# Patient Record
(demographics unavailable — no encounter records)

---

## 2025-05-14 NOTE — HISTORY OF PRESENT ILLNESS
[FreeTextEntry1] : ADEBAYO PACHECO is a 51 year old female here for a physical exam. [de-identified] : Her last physical exam was last year  Vaccines: Tetanus is Not up to date, Tdap 2010 Pneumococcal vaccination is Not up to date Shingrix is Not up to date  Her last dentist visit was within past 6-12 months Her last eye doctor appointment was less than one year ago Her last dermatologist visit was less than one year ago  GYN visit is up to date, 4/2024 Dr. Metzger Mammogram is up to date, 4/2024 stable/benign mammo/US [facility] Colon cancer screening is NOT up to date, colonoscopy 12/2019 wnl, rpt due at 5 yrs (12/2024), Dr. Richey. Has appt scheduled June 2025.   Her diet is healthy overall most of the time, currently doing a little more eating on the run as is lacrosse season and is travelling to watch son play Exercise: regular strengthening and cardio   going to see daughter Libby who is a travel nurse based in [place] soon

## 2025-05-14 NOTE — HEALTH RISK ASSESSMENT
[0] : 2) Feeling down, depressed, or hopeless: Not at all (0) [PHQ-2 Negative - No further assessment needed] : PHQ-2 Negative - No further assessment needed [Never] : Never [DYP1Lklbl] : 0

## 2025-05-14 NOTE — REVIEW OF SYSTEMS
[Palpitations] : palpitations [Joint Pain] : joint pain [Joint Stiffness] : joint stiffness [Muscle Pain] : muscle pain [Negative] : Heme/Lymph [Chest Pain] : no chest pain [Lower Ext Edema] : no lower extremity edema [Abdominal Pain] : no abdominal pain [Diarrhea] : diarrhea [Vomiting] : no vomiting [Heartburn] : no heartburn [Melena] : no melena [FreeTextEntry5] : has h/o MVP seen on prior Echo, gets occas palps but are brief and no assoc sxs, is due for card f/u and might prefer to shift to female card. Dad dx with HOCM and needed debulking surg and she was advised to get checked. She had Echo Spring 2022 with nl LV size  [FreeTextEntry7] : has h/o HH seen on EGD but no signif heartburn [FreeTextEntry9] : some joint aches and pains and stiffness due to OA, also tweaked her back moving furniture earlier this week, going to chiro today and if needed will add massage and acupuncture and will let me know if wants to do PT (L lower back pain rad to L hip)

## 2025-05-14 NOTE — PLAN
[FreeTextEntry1] : Continue all medications as prescribed. Check labs as above. Will adjust any medications based upon lab results.  Reviewed age-appropriate preventive screening tests with patient. Due for gyn exam (names given as might prefer to shift to female provider), mammogram and colonoscopy screening and will schedule/has scheduled for near future.   Reviewed/recommended annual flu vaccine, Tdap booster, pneumococcal booster (PCV20 or PCV21) and Shingrix series.   Defers on ECG today and will sched with card for near future   Discussed clean eating (eg Mediterranean style plant focused whole food eating plan) and regular exercise/staying as physically active as possible. Include balance exercises and strength training and core strengthening exercises for bone health and to decrease risk for falls.  Reviewed importance of good self care (e.g. meditation, yoga, adequate rest, regular exercise, magnesium, clean eating, etc.).  Follow up with specialists as recommended by them. Asked her to have any/all specialists outside [facility] network send consult notes/test results etc to keep me informed.   Follow up for next physical in one year.

## 2025-05-14 NOTE — HEALTH RISK ASSESSMENT
[0] : 2) Feeling down, depressed, or hopeless: Not at all (0) [PHQ-2 Negative - No further assessment needed] : PHQ-2 Negative - No further assessment needed [Never] : Never [BII4Rerur] : 0

## 2025-05-14 NOTE — PHYSICAL EXAM
[No Acute Distress] : no acute distress [Well Nourished] : well nourished [Well Developed] : well developed [Well-Appearing] : well-appearing [Normal Sclera/Conjunctiva] : normal sclera/conjunctiva [EOMI] : extraocular movements intact [Normal Outer Ear/Nose] : the outer ears and nose were normal in appearance [Normal Oropharynx] : the oropharynx was normal [No JVD] : no jugular venous distention [No Lymphadenopathy] : no lymphadenopathy [Supple] : supple [Thyroid Normal, No Nodules] : the thyroid was normal and there were no nodules present [No Respiratory Distress] : no respiratory distress  [No Accessory Muscle Use] : no accessory muscle use [Clear to Auscultation] : lungs were clear to auscultation bilaterally [Normal Rate] : normal rate  [Regular Rhythm] : with a regular rhythm [Normal S1, S2] : normal S1 and S2 [No Murmur] : no murmur heard [No Carotid Bruits] : no carotid bruits [No Varicosities] : no varicosities [Pedal Pulses Present] : the pedal pulses are present [No Edema] : there was no peripheral edema [No Extremity Clubbing/Cyanosis] : no extremity clubbing/cyanosis [Soft] : abdomen soft [Non Tender] : non-tender [Non-distended] : non-distended [No Masses] : no abdominal mass palpated [No HSM] : no HSM [Normal Bowel Sounds] : normal bowel sounds [Normal Posterior Cervical Nodes] : no posterior cervical lymphadenopathy [Normal Anterior Cervical Nodes] : no anterior cervical lymphadenopathy [No Joint Swelling] : no joint swelling [Grossly Normal Strength/Tone] : grossly normal strength/tone [No Rash] : no rash [Coordination Grossly Intact] : coordination grossly intact [No Focal Deficits] : no focal deficits [Normal Gait] : normal gait [Normal Affect] : the affect was normal [Normal Insight/Judgement] : insight and judgment were intact [No CVA Tenderness] : no CVA  tenderness [No Spinal Tenderness] : no spinal tenderness [de-identified] : +TTP over L SI joint region/glutes,  No rash in area of back pain , in pain and takes care with position changes but able to get on/.off exam table independently, nontoxic  [de-identified] : no s/sxs active synovitis

## 2025-05-14 NOTE — REVIEW OF SYSTEMS
Salas can you please take care of this  I do not refill chemo med refills  [Palpitations] : palpitations [Joint Pain] : joint pain [Joint Stiffness] : joint stiffness [Muscle Pain] : muscle pain [Negative] : Heme/Lymph [Chest Pain] : no chest pain [Lower Ext Edema] : no lower extremity edema [Abdominal Pain] : no abdominal pain [Diarrhea] : diarrhea [Vomiting] : no vomiting [Heartburn] : no heartburn [Melena] : no melena [FreeTextEntry5] : has h/o MVP seen on prior Echo, gets occas palps but are brief and no assoc sxs, is due for card f/u and might prefer to shift to female card. Dad dx with HOCM and needed debulking surg and she was advised to get checked. She had Echo Spring 2022 with nl LV size  [FreeTextEntry7] : has h/o HH seen on EGD but no signif heartburn [FreeTextEntry9] : some joint aches and pains and stiffness due to OA, also tweaked her back moving furniture earlier this week, going to chiro today and if needed will add massage and acupuncture and will let me know if wants to do PT (L lower back pain rad to L hip)

## 2025-05-14 NOTE — HISTORY OF PRESENT ILLNESS
[FreeTextEntry1] : ADEBAYO PACHECO is a 51 year old female here for a physical exam. [de-identified] : Her last physical exam was last year  Vaccines: Tetanus is Not up to date, Tdap 2010 Pneumococcal vaccination is Not up to date Shingrix is Not up to date  Her last dentist visit was within past 6-12 months Her last eye doctor appointment was less than one year ago Her last dermatologist visit was less than one year ago  GYN visit is up to date, 4/2024 Dr. Metzger Mammogram is up to date, 4/2024 stable/benign mammo/US [facility] Colon cancer screening is NOT up to date, colonoscopy 12/2019 wnl, rpt due at 5 yrs (12/2024), Dr. Richey. Has appt scheduled June 2025.   Her diet is healthy overall most of the time, currently doing a little more eating on the run as is lacrosse season and is travelling to watch son play Exercise: regular strengthening and cardio   going to see daughter Libby who is a travel nurse based in [place] soon

## 2025-05-14 NOTE — PHYSICAL EXAM
[No Acute Distress] : no acute distress [Well Nourished] : well nourished [Well Developed] : well developed [Well-Appearing] : well-appearing [Normal Sclera/Conjunctiva] : normal sclera/conjunctiva [EOMI] : extraocular movements intact [Normal Outer Ear/Nose] : the outer ears and nose were normal in appearance [Normal Oropharynx] : the oropharynx was normal [No JVD] : no jugular venous distention [No Lymphadenopathy] : no lymphadenopathy [Supple] : supple [Thyroid Normal, No Nodules] : the thyroid was normal and there were no nodules present [No Respiratory Distress] : no respiratory distress  [No Accessory Muscle Use] : no accessory muscle use [Clear to Auscultation] : lungs were clear to auscultation bilaterally [Normal Rate] : normal rate  [Regular Rhythm] : with a regular rhythm [Normal S1, S2] : normal S1 and S2 [No Murmur] : no murmur heard [No Carotid Bruits] : no carotid bruits [No Varicosities] : no varicosities [Pedal Pulses Present] : the pedal pulses are present [No Edema] : there was no peripheral edema [No Extremity Clubbing/Cyanosis] : no extremity clubbing/cyanosis [Soft] : abdomen soft [Non Tender] : non-tender [Non-distended] : non-distended [No Masses] : no abdominal mass palpated [No HSM] : no HSM [Normal Bowel Sounds] : normal bowel sounds [Normal Posterior Cervical Nodes] : no posterior cervical lymphadenopathy [Normal Anterior Cervical Nodes] : no anterior cervical lymphadenopathy [No Joint Swelling] : no joint swelling [Grossly Normal Strength/Tone] : grossly normal strength/tone [No Rash] : no rash [Coordination Grossly Intact] : coordination grossly intact [No Focal Deficits] : no focal deficits [Normal Gait] : normal gait [Normal Affect] : the affect was normal [Normal Insight/Judgement] : insight and judgment were intact [No CVA Tenderness] : no CVA  tenderness [No Spinal Tenderness] : no spinal tenderness [de-identified] : +TTP over L SI joint region/glutes,  No rash in area of back pain , in pain and takes care with position changes but able to get on/.off exam table independently, nontoxic  [de-identified] : no s/sxs active synovitis

## 2025-05-14 NOTE — ASSESSMENT
[Vaccines Reviewed] : Immunizations reviewed today. Please see immunization details in the vaccine log within the immunization flowsheet.  [FreeTextEntry1] : ADRIANNE PACHECO is a 51 year old female here for a physical exam.  She is also here to follow up on medical issues as noted above.  Adrianne has h/o iron def anemia (resolved at this point in her life), MVP, OA.  She was recently advised by her father that she needs eval incl Echo/?gene testing for HOCM as he was diagnosed with severe HOCM requiring surg. She is due for card f/u and will sched for near future   SHe has had recent flare up of low back pain (first in several yrs) after moving furniture. Is doing supp care. Rx for medical massage given as that has helped in past. Seeing chiro today. Will let me know if wants Rx for PT  Most back pain is musculoskeletal in nature and is self-limited. Patient should begin with low back stretching exercises (e.g. lying on the floor or in bed and pulling the knees to the chest, bending over to touch the toes WITHOUT bouncing up and down, etc.). NSAIDs such as ibuprofen (Advil, Motrin) or Aleve can help to relieve the pain; take with food and limit use of these meds to short term and/or occasional use and only if no contraindication to using NSAIDs. Warm moist heat (a warm shower or bath or a heating pad) may help as well as can heating pad bandages (e.g. Therma Care). Massage and/or acupuncture can be useful to relieve back spasms. Once pain starts to resolve, core strengthening exercises (such as sit ups, Yoga, Pilates, etc.) should be done daily/near daily, in addition to daily stretching.  If back pain is not improving with the above measures, recommend follow up in the office to determine if a prescription anti-inflammatory medication or muscle relaxant is needed. Some patients also benefit from physical therapy as well.  If the back pain is associated with leg weakness, numbness, or tingling, or if there is any incontinence of urine or stool, patient should go immediately to the ER for urgent evaluation.

## 2025-06-04 NOTE — DISCUSSION/SUMMARY
[FreeTextEntry1] : 1) pap performed 2) rx for screening mammo/sono issued 3) pt exercises regularly 4) pt has f/u scheduled with Dr. Richey  return to office in one year, sooner prn

## 2025-06-04 NOTE — HISTORY OF PRESENT ILLNESS
[Currently Active] : currently active [Men] : men [TextBox_4] : Adrianne is a 52 y/o  who presents today for an annual exam.  She has had the Mirena IUD which was inserted 2019. We discussed that this is now effective for up t 8 years. she has occasional menses, LMP was 25  She has a history of dense breasts.  She is due for a mammogram  She has had a colonoscopy and is due for a repeat.  She works in real estate, oldest daughter is a trauma travel RN currently in [place], son attends [employer] and plays Lacrosse there, younger child is in [employer] [Mammogramdate] : 2024 [BreastSonogramDate] : 2024 [TextBox_31] : NO HISTORY OF ABNORMAL [BoneDensityDate] : NEVER [ColonoscopyDate] : UP TO DATE [TextBox_43] : DUE FOR F/U WITH DR. OCASIO [FreeTextEntry1] : 5/19/25